# Patient Record
Sex: MALE | Race: BLACK OR AFRICAN AMERICAN | Employment: FULL TIME | URBAN - METROPOLITAN AREA
[De-identification: names, ages, dates, MRNs, and addresses within clinical notes are randomized per-mention and may not be internally consistent; named-entity substitution may affect disease eponyms.]

---

## 2019-07-26 ENCOUNTER — HOSPITAL ENCOUNTER (EMERGENCY)
Facility: HOSPITAL | Age: 34
Discharge: HOME | End: 2019-07-26
Attending: EMERGENCY MEDICINE

## 2019-07-26 VITALS
HEART RATE: 94 BPM | DIASTOLIC BLOOD PRESSURE: 85 MMHG | SYSTOLIC BLOOD PRESSURE: 138 MMHG | OXYGEN SATURATION: 98 % | BODY MASS INDEX: 37.11 KG/M2 | TEMPERATURE: 97 F | WEIGHT: 280 LBS | HEIGHT: 73 IN | RESPIRATION RATE: 18 BRPM

## 2019-07-26 DIAGNOSIS — L03.211 CELLULITIS OF FACE: Primary | ICD-10-CM

## 2019-07-26 PROCEDURE — 99283 EMERGENCY DEPT VISIT LOW MDM: CPT

## 2019-07-26 RX ORDER — DOXYCYCLINE HYCLATE 100 MG/1
100 CAPSULE ORAL 2 TIMES DAILY
Qty: 20 CAPSULE | Refills: 0 | Status: SHIPPED | OUTPATIENT
Start: 2019-07-26 | End: 2019-08-05

## 2019-07-27 NOTE — ED NOTES
Patient has c/o of swelling and redness to his left earlobe x2 days. Patient denies any inner ear pain, denies n/v/d/fever.

## 2019-07-27 NOTE — ED PROVIDER NOTES
Patient Seen in: [facility] AND [facility] Emergency Department    History   Patient presents with:  Ear Pain    Stated Complaint: ear pain    HPI    65-year-old male with no significant past medical history here with complaints of left earlobe swelling and pain person, place, and time. He appears well-developed and well-nourished. No distress. HENT:   Head: Normocephalic and atraumatic.    Mouth/Throat: Oropharynx is clear and moist.   L earlobe piercing with surrounding cellulitis/induration, no fluctuance or p The patient already has does not have a problem list on file. to contribute to the complexity of his ED evaluation.     - pt  comfortable with d/c at this time, will d/c pt home now with Rx for doxycycline, pt to f/u with Dr. Duane Mackintosh in 2 days or return to

## 2021-04-07 ENCOUNTER — HOSPITAL ENCOUNTER (EMERGENCY)
Facility: HOSPITAL | Age: 36
Discharge: HOME | End: 2021-04-07
Payer: COMMERCIAL

## 2021-04-07 VITALS
TEMPERATURE: 98 F | DIASTOLIC BLOOD PRESSURE: 93 MMHG | HEART RATE: 66 BPM | HEIGHT: 73 IN | BODY MASS INDEX: 35.12 KG/M2 | OXYGEN SATURATION: 96 % | RESPIRATION RATE: 18 BRPM | WEIGHT: 265 LBS | SYSTOLIC BLOOD PRESSURE: 145 MMHG

## 2021-04-07 DIAGNOSIS — M54.2 NECK PAIN: ICD-10-CM
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
## 2021-04-07 PROCEDURE — 99283 EMERGENCY DEPT VISIT LOW MDM: CPT

## 2021-04-07 RX ORDER — CYCLOBENZAPRINE HCL 10 MG
10 TABLET ORAL EVERY 12 HOURS PRN
Qty: 8 TABLET | Refills: 0 | Status: SHIPPED | OUTPATIENT
Start: 2021-04-07 | End: 2021-04-11

## 2021-04-07 RX ORDER — IBUPROFEN 600 MG/1
600 TABLET ORAL EVERY 8 HOURS PRN
Qty: 12 TABLET | Refills: 0 | Status: SHIPPED | OUTPATIENT
Start: 2021-04-07 | End: 2021-04-11

## 2021-04-07 RX ORDER — IBUPROFEN 600 MG/1
600 TABLET ORAL ONCE
Status: COMPLETED | OUTPATIENT
Start: 2021-04-07 | End: 2021-04-07
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
## 2021-04-07 NOTE — ED QUICK NOTES
Pt A&Ox3, reg resp. Pt denies pmhx. Pt reports neck pain, denies loc. Pt aware of plan of care, call light within reach.
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
## 2021-11-09 ENCOUNTER — TELEPHONE (OUTPATIENT)
Dept: SURGERY | Facility: CLINIC | Age: 36
End: 2021-11-09

## 2021-11-09 NOTE — TELEPHONE ENCOUNTER
Pt was scheduled for a vasectomy consult with Dr. Christy Lesches and Dr. Christy Lesches does not do vasectomies at his time, I called pt verified name/ rescheduled pt in Dec 2021 for consult with Dr. Javad Rapp.

## 2021-11-12 ENCOUNTER — OFFICE VISIT (OUTPATIENT)
Dept: OTOLARYNGOLOGY | Facility: CLINIC | Age: 36
End: 2021-11-12
Payer: COMMERCIAL

## 2021-11-12 DIAGNOSIS — Q18.1 EAR CYSTS: Primary | ICD-10-CM

## 2021-11-12 PROCEDURE — 99203 OFFICE O/P NEW LOW 30 MIN: CPT | Performed by: OTOLARYNGOLOGY

## 2021-11-12 RX ORDER — CLINDAMYCIN HYDROCHLORIDE 300 MG/1
300 CAPSULE ORAL EVERY 8 HOURS
Qty: 21 CAPSULE | Refills: 0 | Status: SHIPPED | OUTPATIENT
Start: 2021-11-12 | End: 2021-11-19

## 2021-11-12 NOTE — PROGRESS NOTES
Tim Chung is a 39year old male.   Patient presents with:  Ear Problem: infection of left ear, per pt c/o pain, feels swollen, drainage- white/yellow , pt using OTC baby rash cream in ear      HISTORY OF PRESENT ILLNESS  He presents with draining whi nerves II through XII grossly intact.    Head/Face Normal Facial features - Normal. Eyebrows - Normal. Skull - Normal.        Nasopharynx Normal External nose - Normal. Lips/teeth/gums - Normal. Tonsils - Normal. Oropharynx - Normal.   Ears Normal Inspectio

## 2021-11-30 ENCOUNTER — OFFICE VISIT (OUTPATIENT)
Dept: OTOLARYNGOLOGY | Facility: CLINIC | Age: 36
End: 2021-11-30
Payer: COMMERCIAL

## 2021-11-30 VITALS — TEMPERATURE: 98 F

## 2021-11-30 DIAGNOSIS — Q18.1 EAR CYSTS: Primary | ICD-10-CM

## 2021-11-30 PROCEDURE — 99213 OFFICE O/P EST LOW 20 MIN: CPT | Performed by: OTOLARYNGOLOGY

## 2021-11-30 NOTE — PROGRESS NOTES
Kristie Calixto is a 39year old male. Patient presents with:  Ear Problem: 10 day follow up left ear lobe cyst, states it is improving      HISTORY OF PRESENT ILLNESS  He presents with draining white-yellow material from the earlobe.  He has been using s Conjunctiva - Right: Normal, Left: Normal. Pupil - Right: Normal, Left: Normal. Fundus - Right: Normal, Left: Normal.   Neurological Normal Memory - Normal. Cranial nerves - Cranial nerves II through XII grossly intact.    Head/Face Normal Facial features -

## 2021-12-01 DIAGNOSIS — Q18.1 CYST ON EAR: Primary | ICD-10-CM

## 2021-12-10 ENCOUNTER — LAB ENCOUNTER (OUTPATIENT)
Dept: LAB | Facility: HOSPITAL | Age: 36
End: 2021-12-10
Attending: OTOLARYNGOLOGY
Payer: COMMERCIAL

## 2021-12-10 DIAGNOSIS — Z01.818 PREOP TESTING: ICD-10-CM

## 2021-12-13 ENCOUNTER — HOSPITAL ENCOUNTER (OUTPATIENT)
Facility: HOSPITAL | Age: 36
Setting detail: HOSPITAL OUTPATIENT SURGERY
Discharge: HOME | End: 2021-12-13
Attending: OTOLARYNGOLOGY | Admitting: OTOLARYNGOLOGY
Payer: COMMERCIAL

## 2021-12-13 VITALS
HEART RATE: 66 BPM | RESPIRATION RATE: 17 BRPM | HEIGHT: 73 IN | OXYGEN SATURATION: 98 % | BODY MASS INDEX: 35.12 KG/M2 | TEMPERATURE: 98 F | DIASTOLIC BLOOD PRESSURE: 74 MMHG | WEIGHT: 265 LBS | SYSTOLIC BLOOD PRESSURE: 143 MMHG

## 2021-12-13 DIAGNOSIS — Q18.1 CYST ON EAR: ICD-10-CM

## 2021-12-13 DIAGNOSIS — Z01.818 PREOP TESTING: Primary | ICD-10-CM

## 2021-12-13 PROCEDURE — 0HX3XZZ TRANSFER LEFT EAR SKIN, EXTERNAL APPROACH: ICD-10-PCS | Performed by: OTOLARYNGOLOGY

## 2021-12-13 PROCEDURE — 88305 TISSUE EXAM BY PATHOLOGIST: CPT | Performed by: OTOLARYNGOLOGY

## 2021-12-13 PROCEDURE — 88304 TISSUE EXAM BY PATHOLOGIST: CPT | Performed by: OTOLARYNGOLOGY

## 2021-12-13 PROCEDURE — 0JB10ZZ EXCISION OF FACE SUBCUTANEOUS TISSUE AND FASCIA, OPEN APPROACH: ICD-10-PCS | Performed by: OTOLARYNGOLOGY

## 2021-12-13 RX ORDER — LIDOCAINE HYDROCHLORIDE AND EPINEPHRINE 10; 10 MG/ML; UG/ML
INJECTION, SOLUTION INFILTRATION; PERINEURAL AS NEEDED
Status: DISCONTINUED | OUTPATIENT
Start: 2021-12-13 | End: 2021-12-13 | Stop reason: HOSPADM

## 2021-12-13 RX ORDER — CEPHALEXIN 500 MG/1
500 CAPSULE ORAL EVERY 8 HOURS
Qty: 21 CAPSULE | Refills: 0 | Status: SHIPPED | OUTPATIENT
Start: 2021-12-13

## 2021-12-13 RX ORDER — HYDROCODONE BITARTRATE AND ACETAMINOPHEN 5; 325 MG/1; MG/1
1 TABLET ORAL EVERY 6 HOURS PRN
Qty: 20 TABLET | Refills: 0 | Status: SHIPPED | OUTPATIENT
Start: 2021-12-13

## 2021-12-13 NOTE — BRIEF OP NOTE
Pre-Operative Diagnosis: Cyst on ear [Q18.1]     Post-Operative Diagnosis: Cyst on ear [Q18.1]      Procedure Performed:   EXCISION AND CLOSURE OF LEFT EAR CYST X2    Surgeon(s) and Role:     * Natalya Jackson MD - Primary      [place]  reviewed    Liza Horan

## 2021-12-13 NOTE — INTERVAL H&P NOTE
Pre-op Diagnosis: Cyst on ear [Q18.1]    The above referenced H&P was reviewed by Chantelle Orozco. Manuel Arenas MD on 12/13/2021, the patient was examined and no significant changes have occurred in the patient's condition since the H&P was performed.   I discussed with

## 2021-12-13 NOTE — INTERVAL H&P NOTE
Pre-op Diagnosis: Cyst on ear [Q18.1]    The above referenced H&P was reviewed by Gayatri Andres. Ngoc Reed MD on 12/13/2021, the patient was examined and no significant changes have occurred in the patient's condition since the H&P was performed.   I discussed with

## 2021-12-13 NOTE — H&P
HISTORY OF PRESENT ILLNESS  He presents with draining white-yellow material from the earlobe. He has been using some baby rash cream in his ear. History of piercing when he was a teenager.  No other new signs, symptoms or complaints.     11/30/21 last visit Neurological Normal Memory - Normal. Cranial nerves - Cranial nerves II through XII grossly intact.    Head/Face Normal Facial features - Normal. Eyebrows - Normal. Skull - Normal.           Nasopharynx Normal External nose - Normal. Lips/teeth/gums - Nor

## 2021-12-14 ENCOUNTER — TELEPHONE (OUTPATIENT)
Dept: OTOLARYNGOLOGY | Facility: CLINIC | Age: 36
End: 2021-12-14

## 2021-12-14 NOTE — TELEPHONE ENCOUNTER
Post op day 1 EXCISION AND CLOSURE OF LEFT EAR CYST X2,patient stated he is doing fine has a little bit of bleeding but it did stopped ,no fever,dressing dry and intact,reviewed post op medications,advised pt to keep dressing clean and dry ,to call for any

## 2021-12-21 ENCOUNTER — OFFICE VISIT (OUTPATIENT)
Dept: OTOLARYNGOLOGY | Facility: CLINIC | Age: 36
End: 2021-12-21
Payer: COMMERCIAL

## 2021-12-21 VITALS — WEIGHT: 265 LBS | HEIGHT: 73 IN | TEMPERATURE: 98 F | BODY MASS INDEX: 35.12 KG/M2

## 2021-12-21 DIAGNOSIS — Q18.1 EAR CYSTS: Primary | ICD-10-CM

## 2021-12-21 PROCEDURE — 99024 POSTOP FOLLOW-UP VISIT: CPT | Performed by: OTOLARYNGOLOGY

## 2021-12-21 PROCEDURE — 3008F BODY MASS INDEX DOCD: CPT | Performed by: OTOLARYNGOLOGY

## 2021-12-21 NOTE — PROGRESS NOTES
Favio Carbone is a 39year old male. Patient presents with:  Post-Op: for stitches removal      HISTORY OF PRESENT ILLNESS  He presents with draining white-yellow material from the earlobe. He has been using some baby rash cream in his ear.  History of bleeding and easy bruising.            PHYSICAL EXAM    Temp 98.2 °F (36.8 °C) (Tympanic)   Ht 6' 1\" (1.854 m)   Wt 265 lb (120.2 kg)   BMI 34.96 kg/m²        Constitutional Normal Overall appearance - Normal.   Psychiatric Normal Orientation - Oriented to days out from excision of cyst and doing well no complaints or concerns healing very nicely very happy with the results of his procedure. Wound care discussed and understood.   Return to see me as needed        This note was prepared using [facility] v

## 2021-12-23 NOTE — OPERATIVE REPORT
[facility]    PATIENT'S NAME: Theoplis Shaper   ATTENDING PHYSICIAN: Graeme Amaya. Nicole Emanuel MD   OPERATING PHYSICIAN: Graeme Amaya.  Nicole Emanuel MD   PATIENT ACCOUNT#:   [id number]    LOCATION:  [facility] 10  MEDICAL RECORD #:   [id number]       DATE OF posterior skin surfaces being closed using a running locked 5-0 fast-absorbing gut suture. The second lesion was noted to be adjacent to the earlobe as it inserts onto the skin of the face.   This was noted to actually extend into the face of the skin; the

## 2022-01-24 ENCOUNTER — OFFICE VISIT (OUTPATIENT)
Dept: SURGERY | Facility: CLINIC | Age: 37
End: 2022-01-24
Payer: COMMERCIAL

## 2022-01-24 VITALS
WEIGHT: 265 LBS | DIASTOLIC BLOOD PRESSURE: 98 MMHG | SYSTOLIC BLOOD PRESSURE: 173 MMHG | HEART RATE: 73 BPM | BODY MASS INDEX: 35.12 KG/M2 | HEIGHT: 73 IN

## 2022-01-24 DIAGNOSIS — Z30.09 VASECTOMY EVALUATION: Primary | ICD-10-CM

## 2022-01-24 PROCEDURE — 3080F DIAST BP >= 90 MM HG: CPT | Performed by: UROLOGY

## 2022-01-24 PROCEDURE — 3077F SYST BP >= 140 MM HG: CPT | Performed by: UROLOGY

## 2022-01-24 PROCEDURE — 99244 OFF/OP CNSLTJ NEW/EST MOD 40: CPT | Performed by: UROLOGY

## 2022-01-24 PROCEDURE — 3008F BODY MASS INDEX DOCD: CPT | Performed by: UROLOGY

## 2022-01-24 RX ORDER — HYDROCODONE BITARTRATE AND ACETAMINOPHEN 5; 325 MG/1; MG/1
2 TABLET ORAL
Qty: 2 TABLET | Refills: 0 | Status: SHIPPED | OUTPATIENT
Start: 2022-01-24 | End: 2022-01-24

## 2022-01-24 RX ORDER — DIAZEPAM 5 MG/1
15 TABLET ORAL ONCE
Qty: 3 TABLET | Refills: 0 | Status: SHIPPED | OUTPATIENT
Start: 2022-01-24 | End: 2022-01-24

## 2022-01-24 NOTE — PROGRESS NOTES
[facility], [facility] Urology  Initial Office Consultation    HPI:   Estiven Hoyt is a 39year old male here today for consultation at the request of, and a copy of this note will be sent to, Arian You DO.     Patient presents today in consultation abdominal tenderness. Genitourinary:     Penis: Circumcised. Testes: Normal.         Right: Mass, tenderness or swelling not present. Left: Mass, tenderness or swelling not present.       Epididymis:      Right: Normal.      Left: Normal. used.    · Patients should refrain from ejaculation for approximately one week after vasectomy. · Options for fertility after vasectomy include vasectomy reversal and sperm retrieval with in vitro fertilization.  These options are not always successful,

## 2022-01-26 ENCOUNTER — TELEPHONE (OUTPATIENT)
Dept: SURGERY | Facility: CLINIC | Age: 37
End: 2022-01-26

## 2022-01-26 NOTE — TELEPHONE ENCOUNTER
Spoke with patient. Per Karen Ricks, he had an opening this Thursday for procedure and would like to know if he can come in earlier. Patient confirmed and verbalized understanding.  Patient states he is on his way to  his medications, states he has n

## 2022-01-27 ENCOUNTER — PROCEDURE (OUTPATIENT)
Dept: SURGERY | Facility: CLINIC | Age: 37
End: 2022-01-27
Payer: COMMERCIAL

## 2022-01-27 VITALS — SYSTOLIC BLOOD PRESSURE: 144 MMHG | HEART RATE: 76 BPM | DIASTOLIC BLOOD PRESSURE: 86 MMHG

## 2022-01-27 DIAGNOSIS — Z98.52 S/P VASECTOMY: ICD-10-CM

## 2022-01-27 DIAGNOSIS — Z30.2 ENCOUNTER FOR STERILIZATION: Primary | ICD-10-CM

## 2022-01-27 PROCEDURE — 3077F SYST BP >= 140 MM HG: CPT | Performed by: UROLOGY

## 2022-01-27 PROCEDURE — 3079F DIAST BP 80-89 MM HG: CPT | Performed by: UROLOGY

## 2022-01-27 PROCEDURE — 55250 REMOVAL OF SPERM DUCT(S): CPT | Performed by: UROLOGY

## 2022-01-27 NOTE — PROCEDURES
UROLOGY PROCEDURE NOTE  NO-SCALPEL BILATERAL VASECTOMY      PREOPERATIVE DIAGNOSIS: Desires voluntary sterilization - bilateral vasectomy. POSTOPERATIVE DIAGNOSIS: Desires voluntary sterilization - bilateral vasectomy.     PROCEDURE PERFORMED: Voluntary segment of vas deferens was sent separately in formalin to pathology for identification. The patient tolerated the procedure well. Postprocedural care and follow-up instructions provided to patient.     Bill Coyle MD  01/27/22

## (undated) DEVICE — SUCTION CANISTER, 3000CC,SAFELINER: Brand: DEROYAL

## (undated) DEVICE — TRAY SKIN PREP PVP-1

## (undated) DEVICE — ENCORE® LATEX ACCLAIM SIZE 8, STERILE LATEX POWDER-FREE SURGICAL GLOVE: Brand: ENCORE

## (undated) DEVICE — UNDYED MONOFILAMENT (POLYDIOXANONE), ABSORBABLE SURGICAL SUTURE: Brand: PDS

## (undated) DEVICE — SUTURE PLAIN GUT 5-0 PC-1

## (undated) DEVICE — SOL  .9 1000ML BTL

## (undated) DEVICE — HEAD & NECK: Brand: MEDLINE INDUSTRIES, INC.

## (undated) NOTE — ED AVS SNAPSHOT
Jayde Edmondson   MRN: [id number]    Department:  [facility] Emergency Department   Date of Visit:  7/26/2019           Disclosure     Insurance plans vary and the physician(s) referred by the ER may not be covered by your plan.  Please contact CARE PHYSICIAN AT ONCE OR RETURN IMMEDIATELY TO THE EMERGENCY DEPARTMENT. If you have been prescribed any medication(s), please fill your prescription right away and begin taking the medication(s) as directed.   If you believe that any of the medications